# Patient Record
Sex: MALE | Race: ASIAN | NOT HISPANIC OR LATINO | URBAN - METROPOLITAN AREA
[De-identification: names, ages, dates, MRNs, and addresses within clinical notes are randomized per-mention and may not be internally consistent; named-entity substitution may affect disease eponyms.]

---
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
## 2021-03-25 NOTE — ED ADULT NURSE NOTE - ISOLATION TYPE:
Patient is presenting to the Emergency Department with a request to have COVID-19 testing.  Denies symptoms, including cough, shortness of breath, fever, chills, bodyaches or sore throat. None

## 2021-12-06 ENCOUNTER — EMERGENCY (EMERGENCY)
Facility: HOSPITAL | Age: 21
LOS: 1 days | Discharge: HOME | End: 2021-12-06
Attending: EMERGENCY MEDICINE | Admitting: EMERGENCY MEDICINE
Payer: MEDICAID

## 2021-12-06 ENCOUNTER — NON-APPOINTMENT (OUTPATIENT)
Age: 21
End: 2021-12-06

## 2021-12-06 ENCOUNTER — APPOINTMENT (OUTPATIENT)
Dept: ELECTROPHYSIOLOGY | Facility: CLINIC | Age: 21
End: 2021-12-06
Payer: MEDICAID

## 2021-12-06 ENCOUNTER — APPOINTMENT (OUTPATIENT)
Dept: CARDIOLOGY | Facility: CLINIC | Age: 21
End: 2021-12-06
Payer: MEDICAID

## 2021-12-06 VITALS
RESPIRATION RATE: 18 BRPM | HEART RATE: 85 BPM | DIASTOLIC BLOOD PRESSURE: 74 MMHG | HEIGHT: 76 IN | TEMPERATURE: 99 F | OXYGEN SATURATION: 100 % | SYSTOLIC BLOOD PRESSURE: 117 MMHG

## 2021-12-06 VITALS
HEIGHT: 76 IN | WEIGHT: 163 LBS | HEART RATE: 80 BPM | SYSTOLIC BLOOD PRESSURE: 102 MMHG | DIASTOLIC BLOOD PRESSURE: 68 MMHG | BODY MASS INDEX: 19.85 KG/M2 | OXYGEN SATURATION: 97 %

## 2021-12-06 DIAGNOSIS — U07.1 COVID-19: ICD-10-CM

## 2021-12-06 DIAGNOSIS — R19.7 DIARRHEA, UNSPECIFIED: ICD-10-CM

## 2021-12-06 DIAGNOSIS — R00.2 PALPITATIONS: ICD-10-CM

## 2021-12-06 DIAGNOSIS — Z78.9 OTHER SPECIFIED HEALTH STATUS: ICD-10-CM

## 2021-12-06 DIAGNOSIS — R06.02 SHORTNESS OF BREATH: ICD-10-CM

## 2021-12-06 PROBLEM — Z00.00 ENCOUNTER FOR PREVENTIVE HEALTH EXAMINATION: Status: ACTIVE | Noted: 2021-12-06

## 2021-12-06 LAB
ALBUMIN SERPL ELPH-MCNC: 4.1 G/DL — SIGNIFICANT CHANGE UP (ref 3.3–5)
ALP SERPL-CCNC: 75 U/L — SIGNIFICANT CHANGE UP (ref 40–120)
ALT FLD-CCNC: 58 U/L — HIGH (ref 4–41)
ANION GAP SERPL CALC-SCNC: 10 MMOL/L — SIGNIFICANT CHANGE UP (ref 7–14)
AST SERPL-CCNC: 98 U/L — HIGH (ref 4–40)
BASOPHILS # BLD AUTO: 0.12 K/UL — SIGNIFICANT CHANGE UP (ref 0–0.2)
BASOPHILS NFR BLD AUTO: 1.7 % — SIGNIFICANT CHANGE UP (ref 0–2)
BILIRUB SERPL-MCNC: 0.3 MG/DL — SIGNIFICANT CHANGE UP (ref 0.2–1.2)
BUN SERPL-MCNC: 14 MG/DL — SIGNIFICANT CHANGE UP (ref 7–23)
CALCIUM SERPL-MCNC: 9.5 MG/DL — SIGNIFICANT CHANGE UP (ref 8.4–10.5)
CHLORIDE SERPL-SCNC: 104 MMOL/L — SIGNIFICANT CHANGE UP (ref 98–107)
CO2 SERPL-SCNC: 26 MMOL/L — SIGNIFICANT CHANGE UP (ref 22–31)
CREAT SERPL-MCNC: 1.09 MG/DL — SIGNIFICANT CHANGE UP (ref 0.5–1.3)
EOSINOPHIL # BLD AUTO: 0.42 K/UL — SIGNIFICANT CHANGE UP (ref 0–0.5)
EOSINOPHIL NFR BLD AUTO: 6 % — SIGNIFICANT CHANGE UP (ref 0–6)
GLUCOSE SERPL-MCNC: 97 MG/DL — SIGNIFICANT CHANGE UP (ref 70–99)
HCT VFR BLD CALC: 45.7 % — SIGNIFICANT CHANGE UP (ref 39–50)
HGB BLD-MCNC: 15.5 G/DL — SIGNIFICANT CHANGE UP (ref 13–17)
IANC: 3.12 K/UL — SIGNIFICANT CHANGE UP (ref 1.5–8.5)
LYMPHOCYTES # BLD AUTO: 1.95 K/UL — SIGNIFICANT CHANGE UP (ref 1–3.3)
LYMPHOCYTES # BLD AUTO: 27.6 % — SIGNIFICANT CHANGE UP (ref 13–44)
MAGNESIUM SERPL-MCNC: 2.1 MG/DL — SIGNIFICANT CHANGE UP (ref 1.6–2.6)
MCHC RBC-ENTMCNC: 29.3 PG — SIGNIFICANT CHANGE UP (ref 27–34)
MCHC RBC-ENTMCNC: 33.9 GM/DL — SIGNIFICANT CHANGE UP (ref 32–36)
MCV RBC AUTO: 86.4 FL — SIGNIFICANT CHANGE UP (ref 80–100)
MONOCYTES # BLD AUTO: 0.55 K/UL — SIGNIFICANT CHANGE UP (ref 0–0.9)
MONOCYTES NFR BLD AUTO: 7.8 % — SIGNIFICANT CHANGE UP (ref 2–14)
NEUTROPHILS # BLD AUTO: 3.35 K/UL — SIGNIFICANT CHANGE UP (ref 1.8–7.4)
NEUTROPHILS NFR BLD AUTO: 47.4 % — SIGNIFICANT CHANGE UP (ref 43–77)
PLATELET # BLD AUTO: 206 K/UL — SIGNIFICANT CHANGE UP (ref 150–400)
POTASSIUM SERPL-MCNC: 4.4 MMOL/L — SIGNIFICANT CHANGE UP (ref 3.5–5.3)
POTASSIUM SERPL-SCNC: 4.4 MMOL/L — SIGNIFICANT CHANGE UP (ref 3.5–5.3)
PROT SERPL-MCNC: 6.8 G/DL — SIGNIFICANT CHANGE UP (ref 6–8.3)
RBC # BLD: 5.29 M/UL — SIGNIFICANT CHANGE UP (ref 4.2–5.8)
RBC # FLD: 12.2 % — SIGNIFICANT CHANGE UP (ref 10.3–14.5)
SODIUM SERPL-SCNC: 140 MMOL/L — SIGNIFICANT CHANGE UP (ref 135–145)
TSH SERPL-MCNC: 5.92 UIU/ML — HIGH (ref 0.27–4.2)
WBC # BLD: 7.06 K/UL — SIGNIFICANT CHANGE UP (ref 3.8–10.5)
WBC # FLD AUTO: 7.06 K/UL — SIGNIFICANT CHANGE UP (ref 3.8–10.5)

## 2021-12-06 PROCEDURE — 93000 ELECTROCARDIOGRAM COMPLETE: CPT

## 2021-12-06 PROCEDURE — 71046 X-RAY EXAM CHEST 2 VIEWS: CPT | Mod: 26

## 2021-12-06 PROCEDURE — 93010 ELECTROCARDIOGRAM REPORT: CPT

## 2021-12-06 PROCEDURE — 99204 OFFICE O/P NEW MOD 45 MIN: CPT

## 2021-12-06 PROCEDURE — 99284 EMERGENCY DEPT VISIT MOD MDM: CPT | Mod: 25

## 2021-12-06 NOTE — ED PROVIDER NOTE - PROGRESS NOTE DETAILS
Fili PGY3: Patient reevaluated and feeling better. Offered CDU admission, would prefer to follow up outpatient. Reviewed and discussed results with patient. Discussed importance of follow up and return precautions. Patient agrees with plan.

## 2021-12-06 NOTE — ED ADULT NURSE NOTE - OBJECTIVE STATEMENT
22yo male received in room 4. pt A&Ox3, ambulatory, denies pmhx, c/o episode of palpitation and SOB lasting about a hour that woke him up today. pt was seen last week by his PMD for similar episode, was told he had an abnormal EKG, and was referred to the cardiologist. Pt offered no complains at present. Denies cp, sob, dizziness, and palpitation. Respiration even and non-labored. in NAD. NSR on monitor. MD damon in progress. Side rails up, bed at lowest position, call bell within reach, patient oriented to the unit, safety maintained.

## 2021-12-06 NOTE — ED PROVIDER NOTE - NSFOLLOWUPCLINICS_GEN_ALL_ED_FT
[facility]  Cardiology  [address] [place] [id number]  Phone: [phone]  Fax:

## 2021-12-06 NOTE — ED PROVIDER NOTE - PATIENT PORTAL LINK FT
You can access the FollowMyHealth Patient Portal offered by [facility] by registering at the following website: [email or web address]. By joining [employer]’s FollowMyHealth portal, you will also be able to view your health information using other applications (apps) compatible with our system.

## 2021-12-06 NOTE — ED PROVIDER NOTE - OBJECTIVE STATEMENT
22 yo M with no significant PMHx presents to the ED c/o palpitations that woke him from sleep at 4am and lasted for 1 hour. It was associated with SOB, described as pounding, fast heart beat. He first noticed palpitations about 2-3 months ago but it self resolved. Three days ago, pt started having palpitations again but this morning, it lasted longer than usual. Denies any exacerbating factors but states that walking improves his sx. He saw his pcp a few days ago, had an EKG, and was told he had an arrhythmia. While they were doing his EKG, he did not have palpitations. He denies cp, abd pain, n//v/d, dizziness, syncopal episodes, or family history of sudden cardiac death or heart disease.

## 2021-12-06 NOTE — ED ADULT TRIAGE NOTE - CHIEF COMPLAINT QUOTE
saw last week for palpitations, SOB, was told to follow up with cardiology due to irregular EKG? - pt had palpitations that woke him up today - no pmh saw PMD last week for palpitations & SOB, was told to follow up with cardiology due to irregular EKG? - pt had palpitations that woke him up today - no pmh saw PMD last week for palpitations & SOB, was told to follow up with cardiology due to irregular EKG? - pt had palpitations that woke him up today - no pmh - no symptoms at present, appears comfortable

## 2021-12-06 NOTE — ED PROVIDER NOTE - NS ED ROS FT
Constitutional: no fevers; no chills  HEENT: no visual changes, no sore throat, no rhinorrhea  CV: no cp; palpitations  Resp: sob; no cough  GI: no abd pain, no nausea, no vomiting, no diarrhea, no constipation  : no dysuria, no hematuria  MSK: no myalgias; no arthralgias  skin: no rashes  neuro: no HA, no numbness; no weakness, no tingling  endo: no polyuria, no polydipsia

## 2021-12-06 NOTE — ED PROVIDER NOTE - NSFOLLOWUPINSTRUCTIONS_ED_ALL_ED_FT
- Continue all regular medications  - Follow up with your primary doctor within 1 week, discuss your thyroid results  - You will be contacted by our discharge center for a cardiology appointment, please follow up within 2 weeks  - You were given copies of labs and/or imaging results if applicable, please take them to your follow up appointments  - Return to the ER for any worsening symptoms or concerns

## 2021-12-06 NOTE — ED ADULT NURSE NOTE - CHIEF COMPLAINT QUOTE
saw PMD last week for palpitations & SOB, was told to follow up with cardiology due to irregular EKG? - pt had palpitations that woke him up today - no pmh - no symptoms at present, appears comfortable

## 2021-12-06 NOTE — ED PROVIDER NOTE - ATTENDING CONTRIBUTION TO CARE
Seen and examined, pt. with onset of palpitations, 1st episode at rest 2 mo. ago, since then occ. short lived rapid HR episodes, had seen PMD and had abnl EKG, told "arrhythmia." Tonight was awoken from sleep with rapid HR, no skipped beats, no assoc. lightheadedness/dizziness, no N/V, no CP/diaphoresis, + mild SOB. States typically feels better when he walks but tonight palpitations lasted ~1 hr., longer than usual, but resolved prior to arrival at ED.  No recent illness, no fever/chills, no dec. po intake, no new meds, only OTC med is protein powder from [facility]. MMM, clear lungs, heart reg, no murmur, soft abd, NT to palp, no CVAT, no edema, NT calves.

## 2021-12-06 NOTE — ED PROVIDER NOTE - CLINICAL SUMMARY MEDICAL DECISION MAKING FREE TEXT BOX
Prateek Woodard MD. pt presents with palpitations, lasting 1 hr, pounding sensation, fast heart beat, associated w/ mild SOB. no sx currently. denies cp, passing out, dizziness, n/v. was told he had an arrhtyhmia on his ekg at his pmd's office. here, pt hd stable. EKG showing 1st degree av block. no signs of wpw, brugada, long qt, arvd or hocm. denies fam history of sudden cardiac death. pt is well appearing. lungs cta. abd soft nt. rrr nl s1/s2. will check lytes, tsh, cxr, cardiac monitor. will dispo according to ed w/u but will require cards outpt f/u if d/c'd.

## 2021-12-08 PROBLEM — R00.2 PALPITATIONS: Status: ACTIVE | Noted: 2021-12-06

## 2021-12-08 PROBLEM — R06.02 SOB (SHORTNESS OF BREATH): Status: ACTIVE | Noted: 2021-12-06

## 2021-12-08 NOTE — HISTORY OF PRESENT ILLNESS
[FreeTextEntry1] : 21 year-old man without significant past medical history with the exception of mild covid-19 infection in 2020 presents for initial evaluation. The patient presents with his mother accompanying him. Mr. Epperson was sleeping last night when he awoke with a feeling a racing heart beat with accompanying feeling of shortness of breath. His sympoms lasted for up to an hour and resolved gradually with rest. The patient was seen in the emergency room and referred for evaluation. He experienced a similar episode several weeks ago. The patient reports that he exercises regularly including playing basketball and he denies any symptoms with activity. He denies chest discomfort, denies shortness of breath or dyspnea on exertion, denies syncope or pre-syncope, and denies palpitations.

## 2021-12-08 NOTE — DISCUSSION/SUMMARY
[FreeTextEntry1] : 21 year-old man with two recurrent episodes of palpitations and shortness of breath occurring at rest.\par 1. Check 2 week event monitor.\par 2. LVH on EKG with early repolarization. Check TTE and exercise stress test to rule out structural heart disease.\par 3. Mildly elevated LFTs with mildly elevated TSH. Follow-up with PCP.\par 4. Follow-up in 2-3 weeks after non-invasive testing.

## 2021-12-23 PROCEDURE — 93248 EXT ECG>7D<15D REV&INTERPJ: CPT

## 2022-02-07 ENCOUNTER — APPOINTMENT (OUTPATIENT)
Dept: CV DIAGNOSTICS | Facility: HOSPITAL | Age: 22
End: 2022-02-07

## 2022-02-07 ENCOUNTER — OUTPATIENT (OUTPATIENT)
Dept: OUTPATIENT SERVICES | Facility: HOSPITAL | Age: 22
LOS: 1 days | End: 2022-02-07
Payer: MEDICAID

## 2022-02-07 ENCOUNTER — APPOINTMENT (OUTPATIENT)
Dept: CV DIAGNOSITCS | Facility: HOSPITAL | Age: 22
End: 2022-02-07

## 2022-02-07 DIAGNOSIS — R06.02 SHORTNESS OF BREATH: ICD-10-CM

## 2022-02-07 PROCEDURE — 93306 TTE W/DOPPLER COMPLETE: CPT | Mod: 26

## 2022-02-07 PROCEDURE — 93306 TTE W/DOPPLER COMPLETE: CPT

## 2022-02-08 ENCOUNTER — NON-APPOINTMENT (OUTPATIENT)
Age: 22
End: 2022-02-08

## 2022-04-13 ENCOUNTER — APPOINTMENT (OUTPATIENT)
Dept: CV DIAGNOSITCS | Facility: HOSPITAL | Age: 22
End: 2022-04-13

## 2023-04-25 ENCOUNTER — EMERGENCY (EMERGENCY)
Facility: HOSPITAL | Age: 23
LOS: 1 days | Discharge: HOME | End: 2023-04-25
Attending: EMERGENCY MEDICINE
Payer: MEDICAID

## 2023-04-25 VITALS
HEART RATE: 79 BPM | TEMPERATURE: 98 F | WEIGHT: 160.06 LBS | OXYGEN SATURATION: 97 % | DIASTOLIC BLOOD PRESSURE: 79 MMHG | RESPIRATION RATE: 18 BRPM | HEIGHT: 73 IN | SYSTOLIC BLOOD PRESSURE: 129 MMHG

## 2023-04-25 VITALS
SYSTOLIC BLOOD PRESSURE: 115 MMHG | OXYGEN SATURATION: 99 % | HEART RATE: 75 BPM | TEMPERATURE: 98 F | DIASTOLIC BLOOD PRESSURE: 64 MMHG | RESPIRATION RATE: 16 BRPM

## 2023-04-25 PROCEDURE — 99284 EMERGENCY DEPT VISIT MOD MDM: CPT | Mod: 25

## 2023-04-25 PROCEDURE — 73590 X-RAY EXAM OF LOWER LEG: CPT

## 2023-04-25 PROCEDURE — 73610 X-RAY EXAM OF ANKLE: CPT

## 2023-04-25 PROCEDURE — 73630 X-RAY EXAM OF FOOT: CPT | Mod: 26,RT

## 2023-04-25 PROCEDURE — 99284 EMERGENCY DEPT VISIT MOD MDM: CPT

## 2023-04-25 PROCEDURE — 73630 X-RAY EXAM OF FOOT: CPT

## 2023-04-25 PROCEDURE — 73610 X-RAY EXAM OF ANKLE: CPT | Mod: 26,RT

## 2023-04-25 PROCEDURE — 73590 X-RAY EXAM OF LOWER LEG: CPT | Mod: 26,RT

## 2023-04-25 RX ORDER — ACETAMINOPHEN 500 MG
975 TABLET ORAL ONCE
Refills: 0 | Status: COMPLETED | OUTPATIENT
Start: 2023-04-25 | End: 2023-04-25

## 2023-04-25 RX ADMIN — Medication 975 MILLIGRAM(S): at 17:07

## 2023-04-25 NOTE — ED PROVIDER NOTE - WR ORDER STATUS 1
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   - MRSA bacteremia and MRSA UTI; Blood cultures + MRSA 6/9, cleared 6/12  - 6/15 ISHMAEL negative for vegetations.    - continue vancomycin  - f/u continued neg cultures from osh (cleared 6/12)  - source ?chronic R breast wound vs. Necrotic mediastinal mass (possible abscess given rapidly growing and h/o mrsa bacteremia vs malignancy)  - consulted ID- recommended: US R breast to assess for abscess, ESR & CRP, MRI C/T/L w/ contrast    Resulted

## 2023-04-25 NOTE — ED PROVIDER NOTE - NSPTACCESSSVCSAPPTDETAILS_ED_ALL_ED_FT
Ankle Sprain Otezla Counseling: The side effects of Otezla were discussed with the patient, including but not limited to worsening or new depression, weight loss, diarrhea, nausea, upper respiratory tract infection, and headache. Patient instructed to call the office should any adverse effect occur.  The patient verbalized understanding of the proper use and possible adverse effects of Otezla.  All the patient's questions and concerns were addressed.

## 2023-04-25 NOTE — ED PROVIDER NOTE - PROGRESS NOTE DETAILS
Patient xrays reviewed without fracture. Placed in removable ankle splint with crutches. Crutch teaching performed in ED w/ successful ambulation. Pt aware to remain non-weight bearing until follow up. Will provide orthopedic f/up at d/c   May Solis PA-C

## 2023-04-25 NOTE — ED ADULT NURSE NOTE - OBJECTIVE STATEMENT
23yM, No PMH, presents to the ED c/o R foot and ankle pain after "rolling is ankle" while playing basketball 3 days ago. Patient was in [place] during incident so did not seek care until today when he returned home. Pain has been constant, endorsing painful ambulation, feels better while resting. He has been taking motrin with minimal relief. Report prior ankle sprain on the R side. He denies numbness, tingling,  head injury, loc, chest pain, sob, abd pain, n/v

## 2023-04-25 NOTE — ED PROVIDER NOTE - PATIENT PORTAL LINK FT
You can access the FollowMyHealth Patient Portal offered by [facility] by registering at the following website: [email or web address]. By joining [facility]’s FollowMyHealth portal, you will also be able to view your health information using other applications (apps) compatible with our system.

## 2023-04-25 NOTE — ED PROVIDER NOTE - OBJECTIVE STATEMENT
23 year old male with no sig pmhx presents to the ED c/o R foot and ankle pain after "rolling is ankle" while playing basketball 3 days ago. Patient was in [place] during incident so did not seek care until today when he returned home. He reports he inverted his ankle during the injury. He has had persistent pain since. He reports he has been ambulating but pain worsens with walking. He has been taking Ibuprofen with 23 year old male with no sig pmhx presents to the ED c/o R foot and ankle pain after "rolling is ankle" while playing basketball 3 days ago. Patient was in [place] during incident so did not seek care until today when he returned home. He reports he inverted his ankle during the injury. He has had persistent pain since. He reports he has been ambulating but pain worsens with walking. He has been taking Ibuprofen with minimal relief. Report prior ankle sprain on the R side. He denies numbness, tingling, other msk pain, head injury, loc, chest pain, sob, abd pain, n/v

## 2023-04-25 NOTE — ED PROVIDER NOTE - NSFOLLOWUPINSTRUCTIONS_ED_ALL_ED_FT
1. Please follow up with your Primary Care Doctor after discharge, bring a copy of your results to follow up appointment for review     2. Please rest, stay hydrated. For continued or recurrent pain recommend taking over the counter Tylenol (acetaminophen) 1000mg every 6 hours as needed and/or over the counter Motrin (Ibuprofen/Advil) 600mg every 6 hours as needed    3. Apply cold pack for 20 minutes multiple times daily. Elevate your ankle when possible. Keep splint clean, dry, and in place as advised and use crutch assistance as advised when ambulating     4. Follow up with Orthopedics after discharge for reevaluation and continued management. Please see office information below to call and schedule appointment:       Orthopedics    [address] [place] [address]    Phone: [phone]    Fax: [phone]    5. We have reached out to our care coordinators to help schedule appointment and you should expect a call in the next 24-48 hours to expedite appointment     6. Return to ED for new or worsened symptoms including severe pain, swelling, numbness/tingling, bluish discoloration or any concerns

## 2023-04-25 NOTE — ED PROVIDER NOTE - PHYSICAL EXAMINATION
CONSTITUTIONAL: Patient is awake, alert and oriented x 3. Patient is well appearing and in no acute distress  HEAD: NCAT  EYES: PERRL b/l, EOMI  ENT: Airway patent, Nasal mucosa clear. Mouth with normal mucosa. Throat has no vesicles, no oropharyngeal exudates and uvula is midline  NECK: supple, FROM  LUNGS: CTA b/l, no wheezing or rales   HEART: RRR.+S1S2 no murmurs  ABDOMEN: Soft, non-distended, nttp,  no rebound or guarding  EXTREMITY: no edema or calf tenderness b/l, FROM upper and lower ext b/l  SKIN: with no rash or lesions  NEURO: Cn3-12 grossly intact. Strength 5/5 UE/LE b/l .Nml gross sensation UE/LE b/l. Gait normal Dosing Month 1 (Required For Cumulative Dosing): 40mg Daily CONSTITUTIONAL: Patient is awake, alert and oriented x 3. Patient is well appearing and in no acute distress  HEAD: NCAT  NECK: supple, FROM  LUNGS: CTA b/l, no wheezing or rales   HEART: RRR.+S1S2 no murmurs  EXTREMITY: FROM b/l upper and Left lower ext b/l. RLE:   SKIN: see "EXTREMITY" otherwise with no rash or lesions  NEURO: No focal deficits CONSTITUTIONAL: Patient is awake, alert and oriented x 3. Patient is well appearing and in no acute distress  HEAD: NCAT  NECK: supple, FROM  LUNGS: CTA b/l, no wheezing or rales   HEART: RRR.+S1S2 no murmurs  EXTREMITY: FROM b/l upper and Left lower ext b/l. RLE: No deformity or ttp of the R hip or knee. There is ecchymosis to the distal anterior shin, b/l ankle and dorsum of the foot, no plantar ecchymosis. + Diffuse swelling to the ankle with ttp to the anterior and posterior malleoli b/l. + ttp to the proximal metatarsals 1-5. 2+ dp pulse with normal gross sensation throughout RLE   SKIN: see "EXTREMITY" otherwise with no rash or lesions  NEURO: No focal deficits

## 2023-04-25 NOTE — ED PROVIDER NOTE - CLINICAL SUMMARY MEDICAL DECISION MAKING FREE TEXT BOX
Ila Cuadra, Attending Physician: 23-year-old male with inversion injury.  Differential diagnosis includes but not limited to: Contusion, sprain, strain, fracture.  Given that patient has been ambulating, suspect ankle sprain.  Will obtain x-rays to evaluate for fracture.  No clinical evidence of [place] injury at this time.  Patient is neurovascularly intact. Will pain control.

## 2023-05-02 ENCOUNTER — APPOINTMENT (OUTPATIENT)
Dept: ORTHOPEDIC SURGERY | Facility: CLINIC | Age: 23
End: 2023-05-02
Payer: MEDICAID

## 2023-05-02 VITALS
DIASTOLIC BLOOD PRESSURE: 76 MMHG | HEIGHT: 76 IN | BODY MASS INDEX: 19.48 KG/M2 | WEIGHT: 160 LBS | SYSTOLIC BLOOD PRESSURE: 120 MMHG | HEART RATE: 79 BPM

## 2023-05-02 PROCEDURE — 99204 OFFICE O/P NEW MOD 45 MIN: CPT

## 2023-05-05 NOTE — PHYSICAL EXAM
[de-identified] : Oriented to time, place, person\par Mood: Normal\par Affect: Normal\par Appearance: Healthy, well appearing, no acute distress.\par Gait: Normal\par Assistive Devices: None\par \par Right Ankle exam:\par \par Skin: Clean, dry, intact\par Inspection: No obvious malalignment, moderate swelling, moderate effusion, ecchymosis to anterior ankle. \par Pulses: 2+ DP/PT pulses \par ROM: limited degrees of dorsiflexion, limited degrees of plantarflexion, normal subtalar motion.\par Tenderness: + tenderness over the lateral malleolus, + CFL +ATFL no PTFL pain. No medial malleolus pain, no deltoid ligament pain. No proximal fibular pain. No heel pain.\par Stability: Negative anterior drawer, negative posterior drawer.\par Strength: 5/5 TA/GS/EHL 5/5 inversion/eversion\par Neuro: In tact to light touch throughout\par Additional tests: Negative syndesmosis squeeze test.  [de-identified] : Images were reviewed from ER dated 4/25/2023.\par \par 3 views of the right ankle that show no acute fracture or dislocation. There is no significant degenerative change seen. There is no gross malalignment. Ankle mortise is intact. No significant other obvious acute osseous abnormality, otherwise unremarkable. \par \par 3 views of the right foot that show no acute fracture or dislocation. There is no degenerative change seen. There is no gross malalignment. No significant other obvious osseous abnormality, otherwise unremarkable. \par \par 3 views of the right tib/fib that show no acute fracture or dislocation. There is no degenerative change seen. There is no gross malalignment. No significant other obvious osseous abnormality, otherwise unremarkable.

## 2023-05-05 NOTE — DISCUSSION/SUMMARY
[de-identified] : 22 y/o male with right ankle pain. \par \par The patient presents with an acute inversion injury to the right ankle with an injury to the distal fibula as well as a lateral ligamentous complex. I outlined a treatment protocol that will require a period of activity modification and relative rest. Further nonoperative modalities of treatment include relative rest from impact loading exercise, NSAID's/tylenol prn, cold compress for swelling control, compressive wraps/bracing, gradual return to weight bearing and load bearing exercise with or without the guidance of physical therapy for balance/proprioceptive/strength training.\par \par In addition, I discussed the spectrum of sprain injuries and short and long term outcomes. The majority of sprain respond well to nonoperative modalities of treatment, and the indication for surgical management is typically reserved for ankle joints that become chronically and grossly unstable.\par \par Recommendation: 2 weeks of CAM immobilization, discontinuation of crutches as able. Ice/Tylenol/NSAIDs p.r.n..\par \par Follow up: 2 weeks for evaluation for possible PT

## 2023-05-05 NOTE — HISTORY OF PRESENT ILLNESS
[de-identified] : 23 year old male presents today with right ankle pain s/p inversion injury 4/22/23. Patient inverted his ankle playing basketball he landed on someone foot. He seen in ER in [place] and x-rays were negative for fx. He has had multiple inversion injuries in the ankle in the past.The pain is constant worse when weightbearing. C/O slight numbness and tingling in the ankle. He has been in [employer] since the injury. Denies instability. \par \par The patient's past medical history, past surgical history, medications and allergies were reviewed by me today with the patient and documented accordingly. In addition, the patient's family and social history, which were noncontributory to this visit, were reviewed also.

## 2023-05-05 NOTE — ADDENDUM
[FreeTextEntry1] : This note was written by Maris Art on 05/02/2023 acting solely as a scribe for Dr. Tyler Peraza.\par \par All medical record entries made by the Scribe were at my, Dr. Tyler Peraza, direction and personally dictated by me on 05/02/2023. I have personally reviewed the chart and agree that the record accurately reflects my personal performance of the history, physical exam, assessment and plan.

## 2023-06-08 ENCOUNTER — APPOINTMENT (OUTPATIENT)
Dept: ORTHOPEDIC SURGERY | Facility: CLINIC | Age: 23
End: 2023-06-08
Payer: MEDICAID

## 2023-06-08 PROCEDURE — 99213 OFFICE O/P EST LOW 20 MIN: CPT

## 2023-06-14 NOTE — HISTORY OF PRESENT ILLNESS
[de-identified] : 23 year old male presents today for follow up of right ankle pain s/p inversion injury 4/22/23. Patient is ambulating in a regular shoe with out assistive device. Reports some residual and swelling but overall feels much better than last visit.

## 2023-06-14 NOTE — PHYSICAL EXAM
[de-identified] : Oriented to time, place, person\par Mood: Normal\par Affect: Normal\par Appearance: Healthy, well appearing, no acute distress.\par Gait: Normal\par Assistive Devices: None\par \par Right Ankle exam:\par \par Skin: Clean, dry, intact\par Inspection: No obvious malalignment, moderate swelling, moderate effusion, ecchymosis to anterior ankle. \par Pulses: 2+ DP/PT pulses \par ROM: limited degrees of dorsiflexion, limited degrees of plantarflexion, normal subtalar motion.\par Tenderness: + tenderness over the lateral malleolus, + CFL +ATFL no PTFL pain. No medial malleolus pain, no deltoid ligament pain. No proximal fibular pain. No heel pain.\par Stability: Negative anterior drawer, negative posterior drawer.\par Strength: 5/5 TA/GS/EHL 5/5 inversion/eversion\par Neuro: In tact to light touch throughout\par Additional tests: Negative syndesmosis squeeze test.  [de-identified] : Images were reviewed from ER dated 4/25/2023.\par \par 3 views of the right ankle that show no acute fracture or dislocation. There is no significant degenerative change seen. There is no gross malalignment. Ankle mortise is intact. No significant other obvious acute osseous abnormality, otherwise unremarkable. \par \par 3 views of the right foot that show no acute fracture or dislocation. There is no degenerative change seen. There is no gross malalignment. No significant other obvious osseous abnormality, otherwise unremarkable. \par \par 3 views of the right tib/fib that show no acute fracture or dislocation. There is no degenerative change seen. There is no gross malalignment. No significant other obvious osseous abnormality, otherwise unremarkable.

## 2023-06-14 NOTE — DISCUSSION/SUMMARY
[de-identified] : 22 y/o male with right ankle sprain\par \par The patient presents with follow-up inversion injury to the right ankle with an injury to the lateral ligamentous complex. We discussed gradual return to weight bearing and load bearing exercise with or without the guidance of physical therapy for balance/proprioceptive/strength training.\par \par Recommendation: Lace-up brace given. HEP vs. PT. Ice/Tylenol/NSAIDs p.r.n..\par \par Follow up as needed.

## 2023-06-14 NOTE — ADDENDUM
[FreeTextEntry1] : This note was written by Maris Art on 06/08/2023 acting solely as a scribe for Dr. Tyler Peraza.\par \par All medical record entries made by the Scribe were at my, Dr. Tyler Peraza, direction and personally dictated by me on 06/08/2023. I have personally reviewed the chart and agree that the record accurately reflects my personal performance of the history, physical exam, assessment and plan.

## 2023-07-18 ENCOUNTER — APPOINTMENT (OUTPATIENT)
Dept: ORTHOPEDIC SURGERY | Facility: CLINIC | Age: 23
End: 2023-07-18
Payer: MEDICAID

## 2023-07-18 DIAGNOSIS — S93.491D SPRAIN OF OTHER LIGAMENT OF RIGHT ANKLE, SUBSEQUENT ENCOUNTER: ICD-10-CM

## 2023-07-18 PROCEDURE — 99213 OFFICE O/P EST LOW 20 MIN: CPT

## 2023-07-28 PROBLEM — S93.491D SPRAIN OF ANTERIOR TALOFIBULAR LIGAMENT OF RIGHT ANKLE, SUBSEQUENT ENCOUNTER: Status: ACTIVE | Noted: 2023-05-05

## 2023-07-28 NOTE — HISTORY OF PRESENT ILLNESS
[de-identified] : 23 year old male presents today for follow up of right ankle pain s/p inversion injury 4/22/23.  He has returned to activity with lace up ankle brace and still has pain. Patient is ambulating in a regular shoe with out assistive device. C/O swelling. Pain has remained stable since last visit.

## 2023-07-28 NOTE — PHYSICAL EXAM
[de-identified] : Oriented to time, place, person\par Mood: Normal\par Affect: Normal\par Appearance: Healthy, well appearing, no acute distress.\par Gait: Normal\par Assistive Devices: Brace\par \par Right Ankle exam:\par \par Skin: Clean, dry, intact\par Inspection: No obvious malalignment, min swelling, min effusion\par Pulses: 2+ DP/PT pulses \par ROM: 10 DF, 20 PF\par Tenderness: + tenderness over the lateral malleolus, + CFL +ATFL no PTFL pain. No medial malleolus pain, no deltoid ligament pain. No proximal fibular pain. No heel pain.\par Stability: Negative anterior drawer, negative posterior drawer.\par Strength: 5/5 TA/GS/EHL 5/5 inversion/eversion\par Neuro: In tact to light touch throughout\par Additional tests: Negative syndesmosis squeeze test.  [de-identified] : Images were reviewed from ER dated 4/25/2023.\par \par 3 views of the right ankle that show no acute fracture or dislocation. There is no significant degenerative change seen. There is no gross malalignment. Ankle mortise is intact. No significant other obvious acute osseous abnormality, otherwise unremarkable. \par \par 3 views of the right foot that show no acute fracture or dislocation. There is no degenerative change seen. There is no gross malalignment. No significant other obvious osseous abnormality, otherwise unremarkable. \par \par 3 views of the right tib/fib that show no acute fracture or dislocation. There is no degenerative change seen. There is no gross malalignment. No significant other obvious osseous abnormality, otherwise unremarkable.

## 2023-07-28 NOTE — ADDENDUM
[FreeTextEntry1] : This note was written by Maris Art on 07/18/2023 acting solely as a scribe for Dr. Tyler Peraza.\par \par All medical record entries made by the Scribe were at my, Dr. Tyler Peraza, direction and personally dictated by me on 07/18/2023. I have personally reviewed the chart and agree that the record accurately reflects my personal performance of the history, physical exam, assessment and plan.

## 2023-07-28 NOTE — DISCUSSION/SUMMARY
[de-identified] : 24 y/o male with right ankle sprain\par \par The patient presents for follow-up of inversion injury to the right ankle with an injury to the lateral ligamentous complex. He continues to have persistent pain despite conservative modalities.  We discussed concerning consideration for additional management that may include advanced imaging to determine if there is any additional injury that is not being considered on x-ray imaging and current clinical evaluation.  Patient voiced understanding and is electing to proceed with advanced imaging at this time.\par \par Recommendation: Lace-up brace. HEP vs. PT. Ice/Tylenol/NSAIDs p.r.n.. Obtain MRI. \par \par Follow up after MRI.

## 2023-08-08 ENCOUNTER — APPOINTMENT (OUTPATIENT)
Dept: MRI IMAGING | Facility: CLINIC | Age: 23
End: 2023-08-08
Payer: MEDICAID

## 2023-08-08 ENCOUNTER — OUTPATIENT (OUTPATIENT)
Dept: OUTPATIENT SERVICES | Facility: HOSPITAL | Age: 23
LOS: 1 days | End: 2023-08-08
Payer: MEDICAID

## 2023-08-08 DIAGNOSIS — S93.491D SPRAIN OF OTHER LIGAMENT OF RIGHT ANKLE, SUBSEQUENT ENCOUNTER: ICD-10-CM

## 2023-08-08 DIAGNOSIS — Z00.8 ENCOUNTER FOR OTHER GENERAL EXAMINATION: ICD-10-CM

## 2023-08-08 PROCEDURE — 73721 MRI JNT OF LWR EXTRE W/O DYE: CPT | Mod: 26,RT

## 2023-08-08 PROCEDURE — 73721 MRI JNT OF LWR EXTRE W/O DYE: CPT

## 2023-08-11 ENCOUNTER — NON-APPOINTMENT (OUTPATIENT)
Age: 23
End: 2023-08-11

## 2024-10-31 NOTE — ED ADULT NURSE NOTE - HOW OFTEN DO YOU HAVE A DRINK CONTAINING ALCOHOL?
Never shoulder and elbow grossly 3+/5 or greater based on functional mobility against gravity (conservative resistance given due to spinal precautions)  5/5